# Patient Record
(demographics unavailable — no encounter records)

---

## 2025-03-01 NOTE — PHYSICAL EXAM
[General Appearance - Alert] : alert [General Appearance - In No Acute Distress] : in no acute distress [General Appearance - Well Nourished] : well nourished [General Appearance - Well Developed] : well developed [General Appearance - Well-Appearing] : healthy appearing [Sclera] : the sclera and conjunctiva were normal [PERRL With Normal Accommodation] : pupils were equal in size, round, and reactive to light [Extraocular Movements] : extraocular movements were intact [Outer Ear] : the ears and nose were normal in appearance [Oropharynx] : the oropharynx was normal [Neck Appearance] : the appearance of the neck was normal [Neck Cervical Mass (___cm)] : no neck mass was observed [Jugular Venous Distention Increased] : there was no jugular-venous distention [Thyroid Diffuse Enlargement] : the thyroid was not enlarged [Lungs Percussion] : the lungs were normal to percussion [Heart Rate And Rhythm] : heart rate was normal and rhythm regular [Edema] : there was no peripheral edema [Abdomen Soft] : soft [Abdomen Tenderness] : non-tender [Abdomen Mass (___ Cm)] : no abdominal mass palpated [Cervical Lymph Nodes Enlarged Posterior Bilaterally] : posterior cervical [Cervical Lymph Nodes Enlarged Anterior Bilaterally] : anterior cervical [Supraclavicular Lymph Nodes Enlarged Bilaterally] : supraclavicular [Axillary Lymph Nodes Enlarged Bilaterally] : axillary [No CVA Tenderness] : no ~M costovertebral angle tenderness [No Spinal Tenderness] : no spinal tenderness [Skin Color & Pigmentation] : normal skin color and pigmentation [Skin Turgor] : normal skin turgor [] : no rash [Cranial Nerves] : cranial nerves 2-12 were intact [Sensation] : the sensory exam was normal to light touch and pinprick [Motor Exam] : the motor exam was normal [No Focal Deficits] : no focal deficits [Oriented To Time, Place, And Person] : oriented to person, place, and time [Impaired Insight] : insight and judgment were intact [Affect] : the affect was normal [Mood] : the mood was normal [FreeTextEntry1] :  Strength-5/5

## 2025-03-01 NOTE — HISTORY OF PRESENT ILLNESS
[FreeTextEntry1] : TOMMY GEORGE is a 35 year old  woman who was referred for further evaluation of joint symptoms and rheumatic diseases.  Since age 16 pt had on cold exposure b/l fingers turn white and when warmed turn ellie red, and was dx with Raynaud's. Rare recent Raynaud's episodes - not bothersome. Recent sleep disturbance and fatigue, without snoring. Pt went to [facility] Dr. Rodríguez, who performed routine blood work, which revealed abnormal test and referred pt to Rheumatology.  Denies recent joint pain. No rash, oral/genital ulcers, alopecia, chest pain, fever, or other joint symptoms. Pt notes recent dry eyes, occasional dry mouth, dry skin, and occasional dry genitals.  Denies any undercooked pork or raw beef consumption. Pt last menstruation was February 1st which was normal and pt is not currently trying to get pregnant. Denies family history of arthritis. Pt was referred for further evaluation of joint symptoms and rheumatic diseases.

## 2025-03-01 NOTE — ASSESSMENT
[FreeTextEntry1] : Impression: TOMMY GEORGE is a 35 year old  woman who was referred for further evaluation of joint symptoms and rheumatic diseases.  Since age 16 pt had on cold exposure b/l fingers turn white and when warmed turn ellie red, and was dx with Raynaud's. Rare recent Raynaud's episodes - not bothersome. Recent sleep disturbance and fatigue, without snoring, consider fibromyalgia. [facility], who performed routine blood work, which revealed abnormal test and referred pt to Rheumatology.  Denies recent joint pain. No rash, oral/genital ulcers, alopecia, chest pain, fever, or other joint symptoms. Pt notes recent dry eyes, occasional dry mouth, dry skin, and occasional dry genitals. Recent cardio exercise via running for 30-60 minutes 5 days a week. On exam patient has mild diffuse swelling bilateral fingers and +DARIO with positive centromere pattern - I will continue to monitor for Scleroderma and MCTD. On PE pt had dry eyes and slightly moist tongue, consider Sjogren's Syndrome and evaluate for sarcoidosis, hepatitis C, IgG 4 related disease and other related diseases.  Denies any undercooked pork or raw beef consumption. Pt last menstruation was February 1st which was normal and pt is not currently trying to get pregnant. Recent lab results shown to me during office visit on pt phone results reveal Vitamin D 24.9 (), +thyroid antibodies, +DARIO with centromere pattern, otherwise unrevealing -- with extensive discussion.  Denies family history of arthritis. Pt was referred for further evaluation of joint symptoms and rheumatic diseases. I will evaluate for various types of rheumatic diseases.   Plan: I reviewed patients chart and previous records with extensive discussion I reviewed recent lab results with patient with extensive discussion Laboratory tests ordered today - see list below - with coordination of care X-rays ordered - see list below - with coordination of care Diagnosis and prognosis discussed Continue current medications (other than those changed below) Vitamin D 50,000 units twice a week--Monday and Thursday--for only 12 weeks, then OTC vitamin D 2000 units q.d. (possible side effects explained) Artificial tears one drop each eye q.i.d. and p.r.n.(Possible side effects explained)  Biotene mouthwash/spray q.i.d. and p.r.n.(Possible side effects explained)  Oral Hydration Patient declines oral medication for dryness  Keep hands and feet and torso warm - patient warned of dangers of gangrene/digital amputation with Raynaud's Increase home thermostat to at least 72 to 74 F  Daily exercise starting at 10 minutes per day, gradually increasing to at least 30 minutes per day--emphasized  Consider Sleep study -- pt declined at this time GYN f/u regarding dry genitals -- with coordination of care -- please send me a consult report  Return visit 2-3 weeks All questions and concerns were addressed

## 2025-03-01 NOTE — ADDENDUM
[FreeTextEntry1] :  I, Nathaniel Archer, acted solely as a scribe for Dr. Myron I. Kleiner, MD. on 02/28/2025. I personally performed the services described in the documentation, reviewed the documentation recorded by the scribe in my presence, and it accurately and completely records my words and actions.

## 2025-04-28 NOTE — HISTORY OF PRESENT ILLNESS
[FreeTextEntry1] : 35-year-old woman for her initial follow up office visit regarding her joint symptoms and rheumatic diseases.  Patient feels fairly well.  She denies any recent Raynaud's episodes.  She has some persistent swelling bilateral fingers.  She has some dry eyes and dry mouth, using artificial tears, Biotene mouthwash, and oral hydration with adequate relief.  She denies recent sleep disturbance and fatigue.  Patient continues  vitamin D supplements.  Patient denies rash or side effects with their medications.  Patient is content with their current medications.  SH: Earlier this month--travel to [facility]

## 2025-04-28 NOTE — ASSESSMENT
[FreeTextEntry1] : Impression: 35-year-old woman for her initial follow up office visit regarding her joint symptoms and rheumatic diseases, including limited scleroderma, Raynaud's, Sjogren syndrome, vitamin D deficiency.  Patient feels fairly well.  She has some swelling bilateral fingers diffusely secondary to her limited scleroderma.  She denies recent Raynaud's episodes.  She has some dry eyes and dry mouth and on exam she has dry eyes and slightly moist tongue secondary to Sjogren syndrome, for which she is using artificial tears, Biotene mouthwash, and oral hydration with adequate relief.  Recent lab results revealed DARIO+ 1: 160 centromere pattern and 1: 80 homogeneous pattern, complement C4 11 (13-39), otherwise unrevealing.  X-rays of her bilateral wrists/hands and chest x-ray were normal, with extensive discussion.  Patient continues vitamin D supplement By prescription 50,000 units twice a week to complete 12 weeks and then she will start OTC vitamin D 2000 units once daily.Patient denies rash or side effects with their medications.  Patient is content with their current treatment regimen.  Plan: I reviewed  chart and previous records I reviewed previous lab results with patient--With extensive discussion Recent x-rays reviewed with the patient with extensive discussion Diagnosis and prognosis discussed Continue other current medications (other than those changed below) Patient declined change or addition of any medications at this time Keep hands and feet and torso warm--patient warned of the dangers of gangrene/digital amputation with Raynaud's--Extensive discussion Increase home temperature at least 72-74 degrees F--extensive discussion Artificial tears one drop each eye q.i.d. and p.r.n.(Possible side effects explained) Biotin mouthwash/spray q.i.d. and p.r.n.(Possible side effects explained) Oral hydration Patient declined oral medication for their dryness Pulmonary consultation for annual PFTs with [facility]--[facility]--with coordination of care Cardiology consultation for echocardiogram to evaluate for pulmonary hypertension and annually--[facility] Sleep study--patient declined Return visit 4 months All questions and concerns were addressed. Total time for this office visit, including face-to-face time and non-face-to-face time, 86 minutes--- including review of the chart and previous records, Detailed review of her medical history,review of previous lab results with extensive discussion with the patient, review of recent imaging reports/x-ray results with extensive discussion with the patient, detailed medication history, review of medications going forward with their possible side effects, Reviewed protocol for prevention of Raynaud's with extensive discussion as noted above, reviewed modalities for treatment of her dryness with extensive discussion, ordered cardiology consultation for echocardiogram with coordination of care, ordered pulmonary consultation for annual PFTs with DLCO with coordination of care, recommended sleep study but patient declined,reviewed the impact of the patient's rheumatic disease on their other medical problems, reviewed the impact of the patient's other medical problems on their rheumatic disease

## 2025-04-28 NOTE — CONSULT LETTER
[Dear  ___] : Dear  [unfilled], [Consult Letter:] : I had the pleasure of evaluating your patient, [unfilled]. [Please see my note below.] : Please see my note below. [Consult Closing:] : Thank you very much for allowing me to participate in the care of this patient.  If you have any questions, please do not hesitate to contact me. [Sincerely,] : Sincerely, [FreeTextEntry3] : Myron Myron I. Kleiner, M.D., FACR Chief, Division of Rheumatology Department of Medicine [facility]

## 2025-05-15 NOTE — REASON FOR VISIT
[FreeTextEntry1] : Hashimotos - thyroid medications and Three pregnancies and no Raynauds doesnt flare up too often    Smoker non smoker  Family hx NON DISEASE

## 2025-07-02 NOTE — RESULTS/DATA
[TextEntry] : [facility] EXAM: [id number] - XR CHEST PA LAT 2V - ORDERED BY: MYRON KLEINER   PROCEDURE DATE: 03/21/2025    INTERPRETATION: PA and lateral views of the chest were obtained for R 76.8.  There are no prior studies for comparison.  The lungs are clear bilaterally. There are no infiltrates on either side. There is no pneumothorax. There is no pleural fluid. There is no hilar or mediastinal widening. The heart is not enlarged. There is no CHF. The bony thorax is grossly intact.  IMPRESSION: Clear lungs with no significant cardiopulmonary abnormalities.  --- End of Report ---       SPENSER VALLES MD; Attending Radiologist This document has been electronically signed. Mar 21 2025 3:05PM  ~~~~~~~~~~~~~~~~~~~~~~~~~~~~~~~~~~~~~~~~~~~~~~~~~~~~~~~~~~~~~~~~~~~~~~~~

## 2025-07-02 NOTE — ASSESSMENT
[FreeTextEntry1] : 35F PMH non-smoker, Hashimoto's thyroiditis, limited scleroderma, Raynaud's, Sjogren's who presents for initial pulmonary evaluation.   - Denies significant pulmonary symptom burden - Will have her return for full PFT - Will check yearly PFT - Ongoing management of limited scleroderma with Dr. Kleiner  The patient expressed understanding and agreement with the plan as outlined above and accepts responsibility to be compliant with any recommended testing, treatment, and follow-up visits.  All relevant questions and concerns were addressed.   30 minutes of time were spent on the encounter. Medical records were reviewed, including but not limited to hospital records, outpatient records, laboratory data, and diagnostic imaging studies. Greater than 50% of the face-to-face encounter time was spent on counseling and/or coordination of care.    Javier Gonzalez MD, [facility] at [place] [address] [place], [place] [address] T: [phone] F: [phone]

## 2025-07-02 NOTE — HISTORY OF PRESENT ILLNESS
[Never] : never [TextBox_4] : 35F PMH non-smoker, Hashimoto's thyroiditis, limited scleroderma, Raynaud's, Sjogren's who presents for initial pulmonary evaluation. She denies any breathing problems. No SOB or chest pains, no chronic cough. No wheezing. Denies exposures to toxic fumes or chemicals. She is an  and works from home.